# Patient Record
Sex: FEMALE | Race: BLACK OR AFRICAN AMERICAN | URBAN - METROPOLITAN AREA
[De-identification: names, ages, dates, MRNs, and addresses within clinical notes are randomized per-mention and may not be internally consistent; named-entity substitution may affect disease eponyms.]

---

## 2017-02-01 ENCOUNTER — OFFICE VISIT (OUTPATIENT)
Dept: FAMILY MEDICINE CLINIC | Age: 62
End: 2017-02-01

## 2017-02-01 VITALS
RESPIRATION RATE: 16 BRPM | SYSTOLIC BLOOD PRESSURE: 125 MMHG | WEIGHT: 119 LBS | HEIGHT: 65 IN | HEART RATE: 94 BPM | OXYGEN SATURATION: 97 % | TEMPERATURE: 98.1 F | DIASTOLIC BLOOD PRESSURE: 92 MMHG | BODY MASS INDEX: 19.83 KG/M2

## 2017-02-01 DIAGNOSIS — R53.83 FATIGUE, UNSPECIFIED TYPE: ICD-10-CM

## 2017-02-01 DIAGNOSIS — Z76.89 ENCOUNTER TO ESTABLISH CARE: ICD-10-CM

## 2017-02-01 DIAGNOSIS — R14.2 BURPING: ICD-10-CM

## 2017-02-01 NOTE — PATIENT INSTRUCTIONS
DASH Diet: Care Instructions  Your Care Instructions  The DASH diet is an eating plan that can help lower your blood pressure. DASH stands for Dietary Approaches to Stop Hypertension. Hypertension is high blood pressure. The DASH diet focuses on eating foods that are high in calcium, potassium, and magnesium. These nutrients can lower blood pressure. The foods that are highest in these nutrients are fruits, vegetables, low-fat dairy products, nuts, seeds, and legumes. But taking calcium, potassium, and magnesium supplements instead of eating foods that are high in those nutrients does not have the same effect. The DASH diet also includes whole grains, fish, and poultry. The DASH diet is one of several lifestyle changes your doctor may recommend to lower your high blood pressure. Your doctor may also want you to decrease the amount of sodium in your diet. Lowering sodium while following the DASH diet can lower blood pressure even further than just the DASH diet alone. Follow-up care is a key part of your treatment and safety. Be sure to make and go to all appointments, and call your doctor if you are having problems. It's also a good idea to know your test results and keep a list of the medicines you take. How can you care for yourself at home? Following the DASH diet  · Eat 4 to 5 servings of fruit each day. A serving is 1 medium-sized piece of fruit, ½ cup chopped or canned fruit, 1/4 cup dried fruit, or 4 ounces (½ cup) of fruit juice. Choose fruit more often than fruit juice. · Eat 4 to 5 servings of vegetables each day. A serving is 1 cup of lettuce or raw leafy vegetables, ½ cup of chopped or cooked vegetables, or 4 ounces (½ cup) of vegetable juice. Choose vegetables more often than vegetable juice. · Get 2 to 3 servings of low-fat and fat-free dairy each day. A serving is 8 ounces of milk, 1 cup of yogurt, or 1 ½ ounces of cheese. · Eat 6 to 8 servings of grains each day.  A serving is 1 slice of bread, 1 ounce of dry cereal, or ½ cup of cooked rice, pasta, or cooked cereal. Try to choose whole-grain products as much as possible. · Limit lean meat, poultry, and fish to 2 servings each day. A serving is 3 ounces, about the size of a deck of cards. · Eat 4 to 5 servings of nuts, seeds, and legumes (cooked dried beans, lentils, and split peas) each week. A serving is 1/3 cup of nuts, 2 tablespoons of seeds, or ½ cup of cooked beans or peas. · Limit fats and oils to 2 to 3 servings each day. A serving is 1 teaspoon of vegetable oil or 2 tablespoons of salad dressing. · Limit sweets and added sugars to 5 servings or less a week. A serving is 1 tablespoon jelly or jam, ½ cup sorbet, or 1 cup of lemonade. · Eat less than 2,300 milligrams (mg) of sodium a day. If you limit your sodium to 1,500 mg a day, you can lower your blood pressure even more. Tips for success  · Start small. Do not try to make dramatic changes to your diet all at once. You might feel that you are missing out on your favorite foods and then be more likely to not follow the plan. Make small changes, and stick with them. Once those changes become habit, add a few more changes. · Try some of the following:  ¨ Make it a goal to eat a fruit or vegetable at every meal and at snacks. This will make it easy to get the recommended amount of fruits and vegetables each day. ¨ Try yogurt topped with fruit and nuts for a snack or healthy dessert. ¨ Add lettuce, tomato, cucumber, and onion to sandwiches. ¨ Combine a ready-made pizza crust with low-fat mozzarella cheese and lots of vegetable toppings. Try using tomatoes, squash, spinach, broccoli, carrots, cauliflower, and onions. ¨ Have a variety of cut-up vegetables with a low-fat dip as an appetizer instead of chips and dip. ¨ Sprinkle sunflower seeds or chopped almonds over salads. Or try adding chopped walnuts or almonds to cooked vegetables. ¨ Try some vegetarian meals using beans and peas. Add garbanzo or kidney beans to salads. Make burritos and tacos with mashed arteaga beans or black beans. Where can you learn more? Go to http://saw-corina.info/. Enter U513 in the search box to learn more about \"DASH Diet: Care Instructions. \"  Current as of: March 23, 2016  Content Version: 11.1  © 1601-1332 [employer]. Care instructions adapted under license by [employer] (which disclaims liability or warranty for this information). If you have questions about a medical condition or this instruction, always ask your healthcare professional. [place] [address] any warranty or liability for your use of this information. How to Read a Food Label to Limit Sodium: Care Instructions  Your Care Instructions  Sodium causes your body to hold on to extra water. This can raise your blood pressure and force your heart and kidneys to work harder. In very serious cases, this could cause you to be put in the hospital. It might even be life-threatening. By limiting sodium, you will feel better and lower your risk of serious problems. Processed foods, fast food, and restaurant foods are the major sources of dietary sodium. The most common name for sodium is salt. Try to limit how much sodium you eat to less than 2,300 milligrams (mg) a day. If you limit your sodium to 1,500 mg a day, you can lower your blood pressure even more. This limit counts all the salt that you eat in foods you cook or in packaged foods. Keep a list of everything you eat and drink. Follow-up care is a key part of your treatment and safety. Be sure to make and go to all appointments, and call your doctor if you are having problems. It's also a good idea to know your test results and keep a list of the medicines you take. How can you care for yourself at home?   Read ingredient lists on food labels  · Read the list of ingredients on food labels to help you find how much sodium is in a food. The label lists the ingredients in a food in descending order (from the most to the least). If salt or sodium is high on the list, there may be a lot of sodium in the food. · Know that sodium has different names. Sodium is also called monosodium glutamate (MSG, common in [place] food), sodium citrate, sodium alginate, sodium hydroxide, and sodium phosphate. Read Nutrition Facts labels  · On most foods, there is a Nutrition Facts label. This will tell you how much sodium is in one serving of food. Look at both the serving size and the sodium amount. The serving size is located at the top of the label, usually right under the \"Nutrition Facts\" title. The amount of sodium is given in the list under the title. It is given in milligrams (mg). ¨ Check the serving size carefully. A single serving is often very small, and you may eat more than one serving. If this is the case, you will eat more sodium than listed on the label. For example, if the serving size for a canned soup is 1 cup and the sodium amount is 470 mg, if you have 2 cups you will eat 940 mg of sodium. · The nutrition facts for fresh fruits and vegetables are not listed on the food. They may be listed somewhere in the store. These foods usually have no sodium or low sodium. · The Nutrition Facts label also gives you the Percent Daily Value for sodium. This is how much of the recommended amount of sodium a serving contains. The daily value for sodium is less than 2,300 mg. So if the Percent Daily Value says 50%, this means one serving is giving you half of this, or 1,150 mg. Buy low-sodium foods  · Look for foods that are made with less sodium. Watch for the following words on the label. ¨ \"Unsalted\" means there is no sodium added to the food. But there may be sodium already in the food naturally. ¨ \"Sodium-free\" means a serving has less than 5 mg of sodium. ¨ \"Very low sodium\" means a serving has 35 mg or less of sodium.   ¨ \"Low-sodium\" means a serving has 140 mg or less of sodium. · \"Reduced-sodium\" means that there is 25% less sodium than what the food normally has. This is still usually too much sodium. Try not to buy foods with this on the label. · Buy fresh vegetables, or frozen vegetables without added sauces. Buy low-sodium versions of canned vegetables, soups, and other canned goods. Where can you learn more? Go to http://saw-corina.info/. Enter 26 [id number] in the search box to learn more about \"How to Read a Food Label to Limit Sodium: Care Instructions. \"  Current as of: July 26, 2016  Content Version: 11.1  © 0917-6634 [employer]. Care instructions adapted under license by [employer] (which disclaims liability or warranty for this information). If you have questions about a medical condition or this instruction, always ask your healthcare professional. [place] [address] any warranty or liability for your use of this information. Low Sodium Diet (2,000 Milligram): Care Instructions  Your Care Instructions  Too much sodium causes your body to hold on to extra water. This can raise your blood pressure and force your heart and kidneys to work harder. In very serious cases, this could cause you to be put in the hospital. It might even be life-threatening. By limiting sodium, you will feel better and lower your risk of serious problems. The most common source of sodium is salt. People get most of the salt in their diet from canned, prepared, and packaged foods. Fast food and restaurant meals also are very high in sodium. Your doctor will probably limit your sodium to less than 2,000 milligrams (mg) a day. This limit counts all the sodium in prepared and packaged foods and any salt you add to your food. Follow-up care is a key part of your treatment and safety. Be sure to make and go to all appointments, and call your doctor if you are having problems.  It's also a good idea to know your test results and keep a list of the medicines you take. How can you care for yourself at home? Read food labels  · Read labels on cans and food packages. The labels tell you how much sodium is in each serving. Make sure that you look at the serving size. If you eat more than the serving size, you have eaten more sodium. · Food labels also tell you the Percent Daily Value for sodium. Choose products with low Percent Daily Values for sodium. · Be aware that sodium can come in forms other than salt, including monosodium glutamate (MSG), sodium citrate, and sodium bicarbonate (baking soda). MSG is often added to Asian food. When you eat out, you can sometimes ask for food without MSG or added salt. Buy low-sodium foods  · Buy foods that are labeled \"unsalted\" (no salt added), \"sodium-free\" (less than 5 mg of sodium per serving), or \"low-sodium\" (less than 140 mg of sodium per serving). Foods labeled \"reduced-sodium\" and \"light sodium\" may still have too much sodium. Be sure to read the label to see how much sodium you are getting. · Buy fresh vegetables, or frozen vegetables without added sauces. Buy low-sodium versions of canned vegetables, soups, and other canned goods. Prepare low-sodium meals  · Cut back on the amount of salt you use in cooking. This will help you adjust to the taste. Do not add salt after cooking. One teaspoon of salt has about 2,300 mg of sodium. · Take the salt shaker off the table. · Flavor your food with garlic, lemon juice, onion, vinegar, herbs, and spices. Do not use soy sauce, lite soy sauce, steak sauce, onion salt, garlic salt, celery salt, mustard, or ketchup on your food. · Use low-sodium salad dressings, sauces, and ketchup. Or make your own salad dressings and sauces without adding salt. · Use less salt (or none) when recipes call for it. You can often use half the salt a recipe calls for without losing flavor.  Other foods such as rice, pasta, and grains do not need added salt. · Rinse canned vegetables, and cook them in fresh water. This removes some--but not all--of the salt. · Avoid water that is naturally high in sodium or that has been treated with water softeners, which add sodium. Call your local water company to find out the sodium content of your water supply. If you buy bottled water, read the label and choose a sodium-free brand. Avoid high-sodium foods  · Avoid eating:  ¨ Smoked, cured, salted, and canned meat, fish, and poultry. ¨ Ham, pantoja, hot dogs, and luncheon meats. ¨ Regular, hard, and processed cheese and regular peanut butter. ¨ Crackers with salted tops, and other salted snack foods such as pretzels, chips, and salted popcorn. ¨ Frozen prepared meals, unless labeled low-sodium. ¨ Canned and dried soups, broths, and bouillon, unless labeled sodium-free or low-sodium. ¨ Canned vegetables, unless labeled sodium-free or low-sodium. ¨ [place] [employer] fries, pizza, tacos, and other fast foods. ¨ Pickles, olives, ketchup, and other condiments, especially soy sauce, unless labeled sodium-free or low-sodium. Where can you learn more? Go to http://saw-corina.info/. Enter H588 in the search box to learn more about \"Low Sodium Diet (2,000 Milligram): Care Instructions. \"  Current as of: July 26, 2016  Content Version: 11.1  © 5869-0123 [employer]. Care instructions adapted under license by [employer] (which disclaims liability or warranty for this information). If you have questions about a medical condition or this instruction, always ask your healthcare professional. [place] [address] any warranty or liability for your use of this information.

## 2017-02-01 NOTE — PROGRESS NOTES
Chief Complaint   Patient presents with    New Patient     [facility]     HPI:    This is a 56 y/o female  patient who presents to \[facility]\"". Patient denies SOB, CP, dizziness, headache. Denies taking any medication. Recently relocated to [address] [facility] from \[facility]\"" 2 years ago. Have not had any PCP. Confirm history of bilateral cataract. Patient.scheduled to have cataract  surgery in her eyes and need pre op clearance scheduled for Monday. Right eye surgery scheduled for 2/7/17 and left eye 2/22/17. Diastolic BP slightly elevated today-125/92  She denies history of HTN but complain of frequent burping with mild chest discomfort. ROS: Pt denies: Wt loss, Fever/Chills, HA, Visual changes, SOB, BASS, Abd pain, N/V/D/C, Blood in stool or urine, Edema. Pertinent positive as above in HPI. All others were negative        History reviewed. No pertinent past medical history. History reviewed. No pertinent past surgical history. Family History   Problem Relation Age of Onset    Diabetes Mother     Cancer Father      unknown       Social History     Social History    Marital status: SINGLE     Spouse name: N/A    Number of children: N/A    Years of education: N/A     Occupational History    Not on file.      Social History Main Topics    Smoking status: Never Smoker    Smokeless tobacco: Never Used    Alcohol use No    Drug use: No    Sexual activity: Yes     Other Topics Concern    Not on file     Social History Narrative    No narrative on file       No Known Allergies      Physical Exam:    Vital Signs:     Visit Vitals    BP (!) 125/92    Pulse 94    Temp 98.1 °F (36.7 °C) (Oral)    Resp 16    Ht 5' 5\" (1.651 m)    Wt 119 lb (54 kg)    SpO2 97%    BMI 19.8 kg/m2         General: a, a & o x 3, afebrile,  interacting appropriately, in no acute distress  HEENT: head normocephalic and atraumatic, conjuctiva clear  Skin: warm and dry, no rashes , no bruises, no skin lesions  Neck: supple, symmetrical, no palpable mass, no thyromegaly, no carotid bruits, no JVD  Respiratory: lung sounds clear bilaterally, good respiratory effort, no wheezes or crackles  Cardiovascular: normal S1S2, regular rate and rhythm, no murmurs  Abdomen: non-distended, normal bowel sounds x 4 quadrants, soft, non-tender to palpation,no hepatosplenomegaly  Musculoskeletal: normal ROM on all joints, no swelling or deformity  Psychiatric: a, a & o x 3, appropriate mood and affect, no thought disorder    Assessment/Plan:    ICD-10-CM ICD-9-CM    1. Elevated BP I10 401.9 A   2. Burping R14.2 787.3 XR CHEST PA LAT   3. Encounter to establish care Z76.89 V65.8    4. Fatigue, unspecified type R53.83 780.79 CBC WITH AUTOMATED DIFF      TSH 3RD GENERATION      LIPID PANEL      VITAMIN D, 25 HYDROXY      METABOLIC PANEL, COMPREHENSIVE      T4, FREE      HEMOGLOBIN A1C WITH EAG           ICD-10-CM ICD-9-CM    1. Elevated BP I10 401.9  AMB POC EKG ROUTINE W/ 12 LEADS, INTER & REP- EKG show non specific ST depression. No Ischemia or T waves inversion. No prior EKG available for comparison. Low salt    Plan to repeat BP at her next visit. 2. Burping R14.2 787.3 XR CHEST PA LAT    Patient advised to avoid carbonated drinks and other gas forming food. 3. Encounter to establish care Z76.89 V65.8    4. Fatigue, unspecified type R53.83 780.79 CBC WITH AUTOMATED DIFF      TSH 3RD GENERATION      LIPID PANEL      VITAMIN D, 25 HYDROXY      METABOLIC PANEL, COMPREHENSIVE      T4, FREE      HEMOGLOBIN A1C WITH EAG         Additional Notes: Discussed today's diagnosis, treatment plans. Discussed medication indications and side effects. After Visit Summary: Provided and discussed printed patient instructions. Answered questions in relation to today's diagnosis.   Follow-up Disposition: Follow up Monday for Pre op exam and lab review        JULIO Dykes  [address] [facility],[address]            Orders Placed This Encounter  XR CHEST PA LAT    CBC WITH AUTOMATED DIFF    TSH 3RD GENERATION    LIPID PANEL    VITAMIN D, 25 HYDROXY    METABOLIC PANEL, COMPREHENSIVE    T4, FREE    HEMOGLOBIN A1C WITH EAG    AMB POC EKG ROUTINE W/ 12 LEADS, INTER & REP

## 2017-02-01 NOTE — MR AVS SNAPSHOT
Visit Information Date & Time Provider Department Dept. Phone Encounter #  
 2/1/2017  1:15 PM Enrique Ruiz, [address] [id number] Follow-up Instructions Return in about 5 days (around 2/6/2017) for follow up pre ope and lab review. Upcoming Health Maintenance Date Due DTaP/Tdap/Td series (1 - Tdap) 1/19/1974 PAP AKA CERVICAL CYTOLOGY 1/19/1974 BREAST CANCER SCRN MAMMOGRAM 1/19/2003 FOBT Q 1 YEAR AGE 50-75 1/19/2003 ZOSTER VACCINE AGE 60> 1/19/2013 INFLUENZA AGE 9 TO ADULT 8/1/2016 Allergies as of 2/1/2017  Review Complete On: 2/1/2017 By: Parul Cruz LPN No Known Allergies Current Immunizations  Reviewed on 2/1/2017 No immunizations on file. Reviewed by Parul Cruz LPN on 0/5/0467 at  4:34 PM  
You Were Diagnosed With   
  
 Codes Comments Elevated BP    -  Primary ICD-10-CM: I10 
ICD-9-CM: 401.9 Burping     ICD-10-CM: R14.2 ICD-9-CM: 787.3 Encounter to establish care     ICD-10-CM: Z76.89 
ICD-9-CM: V65.8 Fatigue, unspecified type     ICD-10-CM: R53.83 ICD-9-CM: 780.79 Vitals BP Pulse Temp Resp Height(growth percentile) Weight(growth percentile) (!) 125/92 94 98.1 °F (36.7 °C) (Oral) 16 5' 5\" (1.651 m) 119 lb (54 kg) SpO2 BMI OB Status Smoking Status 97% 19.8 kg/m2 Postmenopausal Never Smoker Vitals History BMI and BSA Data Body Mass Index Body Surface Area  
 19.8 kg/m 2 1.57 m 2 Your Updated Medication List  
  
Notice  As of 2/1/2017  2:27 PM  
 You have not been prescribed any medications. We Performed the Following AMB POC EKG ROUTINE W/ 12 LEADS, INTER & REP [22644 CPT(R)] Follow-up Instructions Return in about 5 days (around 2/6/2017) for follow up pre ope and lab review. To-Do List   
 02/01/2017 Imaging:  XR CHEST PA LAT   
  
 02/02/2017   Lab:  CBC WITH AUTOMATED DIFF   
  
 02/02/2017 Lab:  HEMOGLOBIN A1C WITH EAG   
  
 02/02/2017 Lab:  LIPID PANEL   
  
 02/02/2017 Lab:  METABOLIC PANEL, COMPREHENSIVE   
  
 02/02/2017 Lab:  T4, FREE   
  
 02/02/2017 Lab:  TSH 3RD GENERATION   
  
 02/02/2017 Lab:  VITAMIN D, 25 HYDROXY Patient Instructions DASH Diet: Care Instructions Your Care Instructions The DASH diet is an eating plan that can help lower your blood pressure. DASH stands for Dietary Approaches to Stop Hypertension. Hypertension is high blood pressure. The DASH diet focuses on eating foods that are high in calcium, potassium, and magnesium. These nutrients can lower blood pressure. The foods that are highest in these nutrients are fruits, vegetables, low-fat dairy products, nuts, seeds, and legumes. But taking calcium, potassium, and magnesium supplements instead of eating foods that are high in those nutrients does not have the same effect. The DASH diet also includes whole grains, fish, and poultry. The DASH diet is one of several lifestyle changes your doctor may recommend to lower your high blood pressure. Your doctor may also want you to decrease the amount of sodium in your diet. Lowering sodium while following the DASH diet can lower blood pressure even further than just the DASH diet alone. Follow-up care is a key part of your treatment and safety. Be sure to make and go to all appointments, and call your doctor if you are having problems. It's also a good idea to know your test results and keep a list of the medicines you take. How can you care for yourself at home? Following the DASH diet · Eat 4 to 5 servings of fruit each day. A serving is 1 medium-sized piece of fruit, ½ cup chopped or canned fruit, 1/4 cup dried fruit, or 4 ounces (½ cup) of fruit juice. Choose fruit more often than fruit juice. · Eat 4 to 5 servings of vegetables each day.  A serving is 1 cup of lettuce or raw leafy vegetables, ½ cup of chopped or cooked vegetables, or 4 ounces (½ cup) of vegetable juice. Choose vegetables more often than vegetable juice. · Get 2 to 3 servings of low-fat and fat-free dairy each day. A serving is 8 ounces of milk, 1 cup of yogurt, or 1 ½ ounces of cheese. · Eat 6 to 8 servings of grains each day. A serving is 1 slice of bread, 1 ounce of dry cereal, or ½ cup of cooked rice, pasta, or cooked cereal. Try to choose whole-grain products as much as possible. · Limit lean meat, poultry, and fish to 2 servings each day. A serving is 3 ounces, about the size of a deck of cards. · Eat 4 to 5 servings of nuts, seeds, and legumes (cooked dried beans, lentils, and split peas) each week. A serving is 1/3 cup of nuts, 2 tablespoons of seeds, or ½ cup of cooked beans or peas. · Limit fats and oils to 2 to 3 servings each day. A serving is 1 teaspoon of vegetable oil or 2 tablespoons of salad dressing. · Limit sweets and added sugars to 5 servings or less a week. A serving is 1 tablespoon jelly or jam, ½ cup sorbet, or 1 cup of lemonade. · Eat less than 2,300 milligrams (mg) of sodium a day. If you limit your sodium to 1,500 mg a day, you can lower your blood pressure even more. Tips for success · Start small. Do not try to make dramatic changes to your diet all at once. You might feel that you are missing out on your favorite foods and then be more likely to not follow the plan. Make small changes, and stick with them. Once those changes become habit, add a few more changes. · Try some of the following: ¨ Make it a goal to eat a fruit or vegetable at every meal and at snacks. This will make it easy to get the recommended amount of fruits and vegetables each day. ¨ Try yogurt topped with fruit and nuts for a snack or healthy dessert. ¨ Add lettuce, tomato, cucumber, and onion to sandwiches.  
¨ Combine a ready-made pizza crust with low-fat mozzarella cheese and lots of vegetable toppings. Try using tomatoes, squash, spinach, broccoli, carrots, cauliflower, and onions. ¨ Have a variety of cut-up vegetables with a low-fat dip as an appetizer instead of chips and dip. ¨ Sprinkle sunflower seeds or chopped almonds over salads. Or try adding chopped walnuts or almonds to cooked vegetables. ¨ Try some vegetarian meals using beans and peas. Add garbanzo or kidney beans to salads. Make burritos and tacos with mashed arteaga beans or black beans. Where can you learn more? Go to http://sawNeuralacorina.info/. Enter T071 in the search box to learn more about \"DASH Diet: Care Instructions. \" Current as of: March 23, 2016 Content Version: 11.1 © 2483-8155 [employer] Care instructions adapted under license by [employer] (which disclaims liability or warranty for this information). If you have questions about a medical condition or this instruction, always ask your healthcare professional. [place] [address] any warranty or liability for your use of this information. How to Read a Food Label to Limit Sodium: Care Instructions Your Care Instructions Sodium causes your body to hold on to extra water. This can raise your blood pressure and force your heart and kidneys to work harder. In very serious cases, this could cause you to be put in the hospital. It might even be life-threatening. By limiting sodium, you will feel better and lower your risk of serious problems. Processed foods, fast food, and restaurant foods are the major sources of dietary sodium. The most common name for sodium is salt. Try to limit how much sodium you eat to less than 2,300 milligrams (mg) a day. If you limit your sodium to 1,500 mg a day, you can lower your blood pressure even more. This limit counts all the salt that you eat in foods you cook or in packaged foods. Keep a list of everything you eat and drink. Follow-up care is a key part of your treatment and safety. Be sure to make and go to all appointments, and call your doctor if you are having problems. It's also a good idea to know your test results and keep a list of the medicines you take. How can you care for yourself at home? Read ingredient lists on food labels · Read the list of ingredients on food labels to help you find how much sodium is in a food. The label lists the ingredients in a food in descending order (from the most to the least). If salt or sodium is high on the list, there may be a lot of sodium in the food. · Know that sodium has different names. Sodium is also called monosodium glutamate (MSG, common in [facility] food), sodium citrate, sodium alginate, sodium hydroxide, and sodium phosphate. Read Nutrition Facts labels · On most foods, there is a Nutrition Facts label. This will tell you how much sodium is in one serving of food. Look at both the serving size and the sodium amount. The serving size is located at the top of the label, usually right under the \"Nutrition Facts\" title. The amount of sodium is given in the list under the title. It is given in milligrams (mg). ¨ Check the serving size carefully. A single serving is often very small, and you may eat more than one serving. If this is the case, you will eat more sodium than listed on the label. For example, if the serving size for a canned soup is 1 cup and the sodium amount is 470 mg, if you have 2 cups you will eat 940 mg of sodium. · The nutrition facts for fresh fruits and vegetables are not listed on the food. They may be listed somewhere in the store. These foods usually have no sodium or low sodium. · The Nutrition Facts label also gives you the Percent Daily Value for sodium. This is how much of the recommended amount of sodium a serving contains. The daily value for sodium is less than 2,300 mg.  So if the Percent Daily Value says 50%, this means one serving is giving you half of this, or 1,150 mg. Buy low-sodium foods · Look for foods that are made with less sodium. Watch for the following words on the label. ¨ \"Unsalted\" means there is no sodium added to the food. But there may be sodium already in the food naturally. ¨ \"Sodium-free\" means a serving has less than 5 mg of sodium. ¨ \"Very low sodium\" means a serving has 35 mg or less of sodium. ¨ \"Low-sodium\" means a serving has 140 mg or less of sodium. · \"Reduced-sodium\" means that there is 25% less sodium than what the food normally has. This is still usually too much sodium. Try not to buy foods with this on the label. · Buy fresh vegetables, or frozen vegetables without added sauces. Buy low-sodium versions of canned vegetables, soups, and other canned goods. Where can you learn more? Go to http://saw-corina.info/. Enter 26 981006 in the search box to learn more about \"How to Read a Food Label to Limit Sodium: Care Instructions. \" Current as of: July 26, 2016 Content Version: 11.1 © 7410-3477 [employer]. Care instructions adapted under license by [employer] (which disclaims liability or warranty for this information). If you have questions about a medical condition or this instruction, always ask your healthcare professional. [place] [address] any warranty or liability for your use of this information. Low Sodium Diet (2,000 Milligram): Care Instructions Your Care Instructions Too much sodium causes your body to hold on to extra water. This can raise your blood pressure and force your heart and kidneys to work harder. In very serious cases, this could cause you to be put in the hospital. It might even be life-threatening. By limiting sodium, you will feel better and lower your risk of serious problems. The most common source of sodium is salt.  People get most of the salt in their diet from canned, prepared, and packaged foods. Fast food and restaurant meals also are very high in sodium. Your doctor will probably limit your sodium to less than 2,000 milligrams (mg) a day. This limit counts all the sodium in prepared and packaged foods and any salt you add to your food. Follow-up care is a key part of your treatment and safety. Be sure to make and go to all appointments, and call your doctor if you are having problems. It's also a good idea to know your test results and keep a list of the medicines you take. How can you care for yourself at home? Read food labels · Read labels on cans and food packages. The labels tell you how much sodium is in each serving. Make sure that you look at the serving size. If you eat more than the serving size, you have eaten more sodium. · Food labels also tell you the Percent Daily Value for sodium. Choose products with low Percent Daily Values for sodium. · Be aware that sodium can come in forms other than salt, including monosodium glutamate (MSG), sodium citrate, and sodium bicarbonate (baking soda). MSG is often added to Asian food. When you eat out, you can sometimes ask for food without MSG or added salt. Buy low-sodium foods · Buy foods that are labeled \"unsalted\" (no salt added), \"sodium-free\" (less than 5 mg of sodium per serving), or \"low-sodium\" (less than 140 mg of sodium per serving). Foods labeled \"reduced-sodium\" and \"light sodium\" may still have too much sodium. Be sure to read the label to see how much sodium you are getting. · Buy fresh vegetables, or frozen vegetables without added sauces. Buy low-sodium versions of canned vegetables, soups, and other canned goods. Prepare low-sodium meals · Cut back on the amount of salt you use in cooking. This will help you adjust to the taste. Do not add salt after cooking. One teaspoon of salt has about 2,300 mg of sodium. · Take the salt shaker off the table. · Flavor your food with garlic, lemon juice, onion, vinegar, herbs, and spices. Do not use soy sauce, lite soy sauce, steak sauce, onion salt, garlic salt, celery salt, mustard, or ketchup on your food. · Use low-sodium salad dressings, sauces, and ketchup. Or make your own salad dressings and sauces without adding salt. · Use less salt (or none) when recipes call for it. You can often use half the salt a recipe calls for without losing flavor. Other foods such as rice, pasta, and grains do not need added salt. · Rinse canned vegetables, and cook them in fresh water. This removes somebut not allof the salt. · Avoid water that is naturally high in sodium or that has been treated with water softeners, which add sodium. Call your local water company to find out the sodium content of your water supply. If you buy bottled water, read the label and choose a sodium-free brand. Avoid high-sodium foods · Avoid eating: ¨ Smoked, cured, salted, and canned meat, fish, and poultry. ¨ Ham, pantoja, hot dogs, and luncheon meats. ¨ Regular, hard, and processed cheese and regular peanut butter. ¨ Crackers with salted tops, and other salted snack foods such as pretzels, chips, and salted popcorn. ¨ Frozen prepared meals, unless labeled low-sodium. ¨ Canned and dried soups, broths, and bouillon, unless labeled sodium-free or low-sodium. ¨ Canned vegetables, unless labeled sodium-free or low-sodium. ¨ [employer]o fries, pizza, tacos, and other fast foods. ¨ Pickles, olives, ketchup, and other condiments, especially soy sauce, unless labeled sodium-free or low-sodium. Where can you learn more? Go to http://saw-corina.info/. Enter Z775 in the search box to learn more about \"Low Sodium Diet (2,000 Milligram): Care Instructions. \" Current as of: July 26, 2016 Content Version: 11.1 © 5960-1623 [employer].  Care instructions adapted under license by [address] (which disclaims liability or warranty for this information). If you have questions about a medical condition or this instruction, always ask your healthcare professional. [address] any warranty or liability for your use of this information. Introducing [address]! Neyda Saenz introduces [employer] patient portal. Now you can access parts of your medical record, email your doctor's office, and request medication refills online. 1. In your internet browser, go to [email or web address] 2. Click on the First Time User? Click Here link in the Sign In box. You will see the New Member Sign Up page. 3. Enter your [employer] Access Code exactly as it appears below. You will not need to use this code after youve completed the sign-up process. If you do not sign up before the expiration date, you must request a new code. · [employer] Access Code: [email or web address] Expires: 5/2/2017  2:26 PM 
 
4. Enter the last four digits of your Social Security Number (xxxx) and Date of Birth (mm/dd/yyyy) as indicated and click Submit. You will be taken to the next sign-up page. 5. Create a [employer] ID. This will be your [employer] login ID and cannot be changed, so think of one that is secure and easy to remember. 6. Create a [employer] password. You can change your password at any time. 7. Enter your Password Reset Question and Answer. This can be used at a later time if you forget your password. 8. Enter your e-mail address. You will receive e-mail notification when new information is available in [address] 9. Click Sign Up. You can now view and download portions of your medical record. 10. Click the Download Summary menu link to download a portable copy of your medical information. If you have questions, please visit the Frequently Asked Questions section of the [employer] website.  Remember, [employer] is NOT to be used for urgent needs. For medical emergencies, dial 911. Now available from your iPhone and Android! Please provide this summary of care documentation to your next provider. Your primary care clinician is listed as Jonda Meckel. If you have any questions after today's visit, please call [phone].

## 2017-02-02 DIAGNOSIS — R53.83 FATIGUE, UNSPECIFIED TYPE: ICD-10-CM

## 2017-02-02 NOTE — LETTER
2/6/2017 2:15 PM 
 
Ms. Landon Antonio [address] [facility] [address] Dear Landon Antonio: Please find your most recent results below. Resulted Orders CBC WITH AUTOMATED DIFF Result Value Ref Range WBC 6.2 3.4 - 10.8 x10E3/uL  
 RBC 4.38 3.77 - 5.28 x10E6/uL HGB 12.9 11.1 - 15.9 g/dL HCT 40.6 34.0 - 46.6 % MCV 93 79 - 97 fL  
 MCH 29.5 26.6 - 33.0 pg  
 MCHC 31.8 31.5 - 35.7 g/dL  
 RDW 12.3 12.3 - 15.4 % PLATELET 775 016 - 024 x10E3/uL NEUTROPHILS 43 % Lymphocytes 45 % MONOCYTES 9 % EOSINOPHILS 2 % BASOPHILS 1 %  
 ABS. NEUTROPHILS 2.7 1.4 - 7.0 x10E3/uL Abs Lymphocytes 2.8 0.7 - 3.1 x10E3/uL  
 ABS. MONOCYTES 0.6 0.1 - 0.9 x10E3/uL  
 ABS. EOSINOPHILS 0.1 0.0 - 0.4 x10E3/uL  
 ABS. BASOPHILS 0.0 0.0 - 0.2 x10E3/uL IMMATURE GRANULOCYTES 0 %  
 ABS. IMM. GRANS. 0.0 0.0 - 0.1 x10E3/uL Narrative Performed at:  [address]  [id number] : Nino Villanueva MD, Phone:  [phone] TSH 3RD GENERATION Result Value Ref Range TSH 1.700 0.450 - 4.500 uIU/mL Narrative Performed at:  [address]  [id number] : Nino Villanueva MD, Phone:  [phone] LIPID PANEL Result Value Ref Range Cholesterol, total 199 100 - 199 mg/dL Triglyceride 70 0 - 149 mg/dL HDL Cholesterol 67 >39 mg/dL VLDL, calculated 14 5 - 40 mg/dL LDL, calculated 118 (H) 0 - 99 mg/dL Narrative Performed at:  [address]  [id number] : Nino Villanueva MD, Phone:  [phone] VITAMIN D, 25 HYDROXY Result Value Ref Range VITAMIN D, 25-HYDROXY 26.7 (L) 30.0 - 100.0 ng/mL Comment:  
   Vitamin D deficiency has been defined by the [facility] practice guideline as a 
level of serum 25-OH vitamin D less than 20 ng/mL (1,2). The Endocrine Society went on to further define vitamin D 
insufficiency as a level between 21 and 29 ng/mL (2). 1. IOM ([place] of [employer]). 2010. Dietary reference 
   intakes for calcium and D. [facility] 
   Connected. 2. Lis MF, Fei NC, Colleen DIMAS, et al. 
   Evaluation, treatment, and prevention of vitamin D 
   deficiency: an Endocrine Society clinical practice 
   guideline. JCEM. 2011 Jul; 96(7):1911-30. Narrative Performed at:  [address]  [id number] : Magen Ordonez MD, Phone:  [phone] METABOLIC PANEL, COMPREHENSIVE Result Value Ref Range Glucose 93 65 - 99 mg/dL BUN 11 8 - 27 mg/dL Creatinine 0.81 0.57 - 1.00 mg/dL GFR est non-AA 77 >59 mL/min/1.73 GFR est AA 89 >59 mL/min/1.73  
 BUN/Creatinine ratio 14 11 - 26 Sodium 146 (H) 134 - 144 mmol/L Potassium 4.1 3.5 - 5.2 mmol/L Chloride 107 (H) 96 - 106 mmol/L  
 CO2 25 18 - 29 mmol/L Calcium 10.8 (H) 8.7 - 10.3 mg/dL Protein, total 7.1 6.0 - 8.5 g/dL Albumin 4.1 3.6 - 4.8 g/dL GLOBULIN, TOTAL 3.0 1.5 - 4.5 g/dL A-G Ratio 1.4 1.1 - 2.5 Bilirubin, total 0.8 0.0 - 1.2 mg/dL Alk. phosphatase 68 39 - 117 IU/L  
 AST (SGOT) 19 0 - 40 IU/L  
 ALT (SGPT) 21 0 - 32 IU/L Narrative Performed at:  [address]  [id number] : Magen Ordonez MD, Phone:  [phone] T4, FREE Result Value Ref Range T4, Free 1.23 0.82 - 1.77 ng/dL Narrative Performed at:  [address]  [id number] : Magen Ordonez MD, Phone:  [phone] HEMOGLOBIN A1C WITH EAG Result Value Ref Range Hemoglobin A1c 5.2 4.8 - 5.6 % Comment:  
            Pre-diabetes: 5.7 - 6.4 Diabetes: >6.4 Glycemic control for adults with diabetes: <7.0 Estimated average glucose 103 mg/dL Narrative Performed at:  [address]  [id number] : Margarette Vallecillo MD, Phone:  [phone] CVD REPORT Result Value Ref Range INTERPRETATION Note Comment:  
   Supplement report is available. Narrative Performed at:  [address] [place] [address]  [id number] : Susan Rouse PhD, Phone:  [phone] RECOMMENDATIONS: 
  Labs reviewed essentially normal except for;  
Elevated LDL - 118. Manage with diet and exercise. Vit d low- advise to take 1000 units daily of Vitamin D... Over the counter. 
    
   
 
 
 
Please call me if you have any questions: [phone] Sincerely, Lisa Cavazos NP

## 2017-02-03 LAB
25(OH)D3+25(OH)D2 SERPL-MCNC: 26.7 NG/ML (ref 30–100)
ALBUMIN SERPL-MCNC: 4.1 G/DL (ref 3.6–4.8)
ALBUMIN/GLOB SERPL: 1.4 {RATIO} (ref 1.1–2.5)
ALP SERPL-CCNC: 68 IU/L (ref 39–117)
ALT SERPL-CCNC: 21 IU/L (ref 0–32)
AST SERPL-CCNC: 19 IU/L (ref 0–40)
BASOPHILS # BLD AUTO: 0 X10E3/UL (ref 0–0.2)
BASOPHILS NFR BLD AUTO: 1 %
BILIRUB SERPL-MCNC: 0.8 MG/DL (ref 0–1.2)
BUN SERPL-MCNC: 11 MG/DL (ref 8–27)
BUN/CREAT SERPL: 14 (ref 11–26)
CALCIUM SERPL-MCNC: 10.8 MG/DL (ref 8.7–10.3)
CHLORIDE SERPL-SCNC: 107 MMOL/L (ref 96–106)
CHOLEST SERPL-MCNC: 199 MG/DL (ref 100–199)
CO2 SERPL-SCNC: 25 MMOL/L (ref 18–29)
CREAT SERPL-MCNC: 0.81 MG/DL (ref 0.57–1)
EOSINOPHIL # BLD AUTO: 0.1 X10E3/UL (ref 0–0.4)
EOSINOPHIL NFR BLD AUTO: 2 %
ERYTHROCYTE [DISTWIDTH] IN BLOOD BY AUTOMATED COUNT: 12.3 % (ref 12.3–15.4)
EST. AVERAGE GLUCOSE BLD GHB EST-MCNC: 103 MG/DL
GLOBULIN SER CALC-MCNC: 3 G/DL (ref 1.5–4.5)
GLUCOSE SERPL-MCNC: 93 MG/DL (ref 65–99)
HBA1C MFR BLD: 5.2 % (ref 4.8–5.6)
HCT VFR BLD AUTO: 40.6 % (ref 34–46.6)
HDLC SERPL-MCNC: 67 MG/DL
HGB BLD-MCNC: 12.9 G/DL (ref 11.1–15.9)
IMM GRANULOCYTES # BLD: 0 X10E3/UL (ref 0–0.1)
IMM GRANULOCYTES NFR BLD: 0 %
INTERPRETATION, 910389: NORMAL
LDLC SERPL CALC-MCNC: 118 MG/DL (ref 0–99)
LYMPHOCYTES # BLD AUTO: 2.8 X10E3/UL (ref 0.7–3.1)
LYMPHOCYTES NFR BLD AUTO: 45 %
MCH RBC QN AUTO: 29.5 PG (ref 26.6–33)
MCHC RBC AUTO-ENTMCNC: 31.8 G/DL (ref 31.5–35.7)
MCV RBC AUTO: 93 FL (ref 79–97)
MONOCYTES # BLD AUTO: 0.6 X10E3/UL (ref 0.1–0.9)
MONOCYTES NFR BLD AUTO: 9 %
NEUTROPHILS # BLD AUTO: 2.7 X10E3/UL (ref 1.4–7)
NEUTROPHILS NFR BLD AUTO: 43 %
PLATELET # BLD AUTO: 252 X10E3/UL (ref 150–379)
POTASSIUM SERPL-SCNC: 4.1 MMOL/L (ref 3.5–5.2)
PROT SERPL-MCNC: 7.1 G/DL (ref 6–8.5)
RBC # BLD AUTO: 4.38 X10E6/UL (ref 3.77–5.28)
SODIUM SERPL-SCNC: 146 MMOL/L (ref 134–144)
T4 FREE SERPL-MCNC: 1.23 NG/DL (ref 0.82–1.77)
TRIGL SERPL-MCNC: 70 MG/DL (ref 0–149)
TSH SERPL DL<=0.005 MIU/L-ACNC: 1.7 UIU/ML (ref 0.45–4.5)
VLDLC SERPL CALC-MCNC: 14 MG/DL (ref 5–40)
WBC # BLD AUTO: 6.2 X10E3/UL (ref 3.4–10.8)

## 2017-02-06 ENCOUNTER — OFFICE VISIT (OUTPATIENT)
Dept: FAMILY MEDICINE CLINIC | Age: 62
End: 2017-02-06

## 2017-02-06 VITALS
RESPIRATION RATE: 16 BRPM | HEIGHT: 65 IN | SYSTOLIC BLOOD PRESSURE: 142 MMHG | TEMPERATURE: 97.6 F | WEIGHT: 119 LBS | HEART RATE: 109 BPM | BODY MASS INDEX: 19.83 KG/M2 | DIASTOLIC BLOOD PRESSURE: 87 MMHG

## 2017-02-06 DIAGNOSIS — Z01.818 PRE-OPERATIVE CLEARANCE: Primary | ICD-10-CM

## 2017-02-06 DIAGNOSIS — H26.9 CATARACT OF BOTH EYES: ICD-10-CM

## 2017-02-06 DIAGNOSIS — E55.9 VITAMIN D INSUFFICIENCY: ICD-10-CM

## 2017-02-06 DIAGNOSIS — R00.0 TACHYCARDIA: ICD-10-CM

## 2017-02-06 DIAGNOSIS — E78.00 ELEVATED LDL CHOLESTEROL LEVEL: ICD-10-CM

## 2017-02-06 NOTE — PATIENT INSTRUCTIONS
Cataract Surgery: Before Your Surgery  What is cataract surgery? Cataracts are cloudy areas in the lens of your eye. Your lens is behind the colored part of your eye (iris). Its job is to focus light onto the back of your eye. In some people, cataracts prevent light from reaching the back of the eye. This can cause vision problems. Cataract surgery helps you see better. It replaces your natural lens, which has become cloudy, with a clear artificial one. There are two types of cataract surgery. Phacoemulsification (say \"kcce-dd-co-beb-uak-wni-LORENA-shun\") is the most common type. The doctor makes a small cut in your eye. This cut is called an incision. The doctor uses a special ultrasound tool to break your cloudy lens apart. Sometimes a laser is used too. Then he or she removes the small pieces of the lens through the incision. In most cases, the doctor then inserts an artificial lens through the incision. Most people do not need stitches, because the incision is so small. If the doctor is not able to put in an artificial lens, you can wear a contact lens or thick glasses in place of your natural lens. Extracapsular extraction is a less common type of cataract surgery. The doctor makes a larger incision to remove the whole lens at once. After the doctor removes the lens, he or she stitches up the incision. Recovery from this type of surgery takes longer. Before either surgery, the doctor puts numbing drops in your eye. Some doctors use a shot instead. You may also get medicine to make you feel relaxed. You probably will not feel much pain. The surgery takes about 20 to 40 minutes. After surgery, you may have a bandage or shield on your eye. You will probably go home from surgery after 1 hour in the recovery room. Most people see better in 1 to 3 days. You may be able to go back to work or your normal routine in a few days.  It could take 3 to 10 weeks for your eye to completely heal. After your eye heals, you may still need to wear glasses, especially for reading. Follow-up care is a key part of your treatment and safety. Be sure to make and go to all appointments, and call your doctor if you are having problems. It's also a good idea to know your test results and keep a list of the medicines you take. What happens before surgery? Surgery can be stressful. This information will help you understand what you can expect. And it will help you safely prepare for surgery. Preparing for surgery  · Understand exactly what surgery is planned, along with the risks, benefits, and other options. · Tell your doctors ALL the medicines, vitamins, supplements, and herbal remedies you take. Some of these can increase the risk of bleeding or interact with anesthesia. · If you take blood thinners, such as warfarin (Coumadin), clopidogrel (Plavix), or aspirin, be sure to talk to your doctor. He or she will tell you if you should stop taking these medicines before your surgery. Make sure that you understand exactly what your doctor wants you to do. · Your doctor will tell you which medicines to take or stop before your surgery. You may need to stop taking certain medicines a week or more before surgery. So talk to your doctor as soon as you can. · If you have an advance directive, let your doctor know. It may include a living will and a durable power of  for health care. Bring a copy to the hospital. If you don't have one, you may want to prepare one. It lets your doctor and loved ones know your health care wishes. Doctors advise that everyone prepare these papers before any type of surgery or procedure. What happens on the day of surgery? · Follow the instructions exactly about when to stop eating and drinking. If you don't, your surgery may be canceled. If your doctor told you to take your medicines on the day of surgery, take them with only a sip of water. · Take a bath or shower before you come in for your surgery. Do not apply lotions, perfumes, deodorants, or nail polish. · Take off all jewelry and piercings. And take out contact lenses, if you wear them. At the hospital or surgery center  · Bring a picture ID. · The area for surgery is often marked to make sure there are no errors. · You will be kept comfortable and safe by your anesthesia provider. The anesthesia may make you sleep. Or it may just numb the area being worked on. · The surgery will take about 20 to 40 minutes. Going home  · Be sure you have someone to drive you home. Anesthesia and pain medicine make it unsafe for you to drive. · You will be given more specific instructions about recovering from your surgery. They will cover things like diet, wound care, follow-up care, driving, and getting back to your normal routine. · You may have a bandage or patch over your eye. You may also have a clear shield over your eye. This prevents you from rubbing it. When should you call your doctor? · You have questions or concerns. · You don't understand how to prepare for your surgery. · You become ill before the surgery (such as fever, flu, or a cold). · You need to reschedule or have changed your mind about having the surgery. Where can you learn more? Go to http://saw-corina.info/. Enter K474 in the search box to learn more about \"Cataract Surgery: Before Your Surgery. \"  Current as of: May 23, 2016  Content Version: 11.1  © 0951-4125 [employer]. Care instructions adapted under license by [employer] (which disclaims liability or warranty for this information). If you have questions about a medical condition or this instruction, always ask your healthcare professional. [place] [address] any warranty or liability for your use of this information. DASH Diet: Care Instructions  Your Care Instructions  The DASH diet is an eating plan that can help lower your blood pressure.  DASH stands for Dietary Approaches to Stop Hypertension. Hypertension is high blood pressure. The DASH diet focuses on eating foods that are high in calcium, potassium, and magnesium. These nutrients can lower blood pressure. The foods that are highest in these nutrients are fruits, vegetables, low-fat dairy products, nuts, seeds, and legumes. But taking calcium, potassium, and magnesium supplements instead of eating foods that are high in those nutrients does not have the same effect. The DASH diet also includes whole grains, fish, and poultry. The DASH diet is one of several lifestyle changes your doctor may recommend to lower your high blood pressure. Your doctor may also want you to decrease the amount of sodium in your diet. Lowering sodium while following the DASH diet can lower blood pressure even further than just the DASH diet alone. Follow-up care is a key part of your treatment and safety. Be sure to make and go to all appointments, and call your doctor if you are having problems. It's also a good idea to know your test results and keep a list of the medicines you take. How can you care for yourself at home? Following the DASH diet  · Eat 4 to 5 servings of fruit each day. A serving is 1 medium-sized piece of fruit, ½ cup chopped or canned fruit, 1/4 cup dried fruit, or 4 ounces (½ cup) of fruit juice. Choose fruit more often than fruit juice. · Eat 4 to 5 servings of vegetables each day. A serving is 1 cup of lettuce or raw leafy vegetables, ½ cup of chopped or cooked vegetables, or 4 ounces (½ cup) of vegetable juice. Choose vegetables more often than vegetable juice. · Get 2 to 3 servings of low-fat and fat-free dairy each day. A serving is 8 ounces of milk, 1 cup of yogurt, or 1 ½ ounces of cheese. · Eat 6 to 8 servings of grains each day. A serving is 1 slice of bread, 1 ounce of dry cereal, or ½ cup of cooked rice, pasta, or cooked cereal. Try to choose whole-grain products as much as possible.   · Limit lean meat, poultry, and fish to 2 servings each day. A serving is 3 ounces, about the size of a deck of cards. · Eat 4 to 5 servings of nuts, seeds, and legumes (cooked dried beans, lentils, and split peas) each week. A serving is 1/3 cup of nuts, 2 tablespoons of seeds, or ½ cup of cooked beans or peas. · Limit fats and oils to 2 to 3 servings each day. A serving is 1 teaspoon of vegetable oil or 2 tablespoons of salad dressing. · Limit sweets and added sugars to 5 servings or less a week. A serving is 1 tablespoon jelly or jam, ½ cup sorbet, or 1 cup of lemonade. · Eat less than 2,300 milligrams (mg) of sodium a day. If you limit your sodium to 1,500 mg a day, you can lower your blood pressure even more. Tips for success  · Start small. Do not try to make dramatic changes to your diet all at once. You might feel that you are missing out on your favorite foods and then be more likely to not follow the plan. Make small changes, and stick with them. Once those changes become habit, add a few more changes. · Try some of the following:  ¨ Make it a goal to eat a fruit or vegetable at every meal and at snacks. This will make it easy to get the recommended amount of fruits and vegetables each day. ¨ Try yogurt topped with fruit and nuts for a snack or healthy dessert. ¨ Add lettuce, tomato, cucumber, and onion to sandwiches. ¨ Combine a ready-made pizza crust with low-fat mozzarella cheese and lots of vegetable toppings. Try using tomatoes, squash, spinach, broccoli, carrots, cauliflower, and onions. ¨ Have a variety of cut-up vegetables with a low-fat dip as an appetizer instead of chips and dip. ¨ Sprinkle sunflower seeds or chopped almonds over salads. Or try adding chopped walnuts or almonds to cooked vegetables. ¨ Try some vegetarian meals using beans and peas. Add garbanzo or kidney beans to salads. Make burritos and tacos with mashed arteaga beans or black beans. Where can you learn more?   Go to http://saw-corina.info/. Enter L031 in the search box to learn more about \"DASH Diet: Care Instructions. \"  Current as of: March 23, 2016  Content Version: 11.1  © 9626-8265 [employer]. Care instructions adapted under license by [employer] (which disclaims liability or warranty for this information). If you have questions about a medical condition or this instruction, always ask your healthcare professional. [place] [address] any warranty or liability for your use of this information.

## 2017-02-06 NOTE — MR AVS SNAPSHOT
Visit Information Date & Time Provider Department Dept. Phone Encounter #  
 2/6/2017  9:00 AM Niko Stover [id number] Follow-up Instructions Return in about 1 month (around 3/6/2017) for follow up for elevated BP. Upcoming Health Maintenance Date Due Hepatitis C Screening 1955 DTaP/Tdap/Td series (1 - Tdap) 1/19/1974 PAP AKA CERVICAL CYTOLOGY 1/19/1974 BREAST CANCER SCRN MAMMOGRAM 1/19/2003 FOBT Q 1 YEAR AGE 50-75 1/19/2003 ZOSTER VACCINE AGE 60> 1/19/2013 INFLUENZA AGE 9 TO ADULT 8/1/2016 Allergies as of 2/6/2017  Review Complete On: 2/6/2017 By: Chau Keen LPN No Known Allergies Current Immunizations  Reviewed on 2/6/2017 No immunizations on file. Reviewed by Chau Keen LPN on 2/2/7619 at  5:91 AM  
Vitals BP Pulse Temp Resp Height(growth percentile) Weight(growth percentile) 142/87 (!) 109 97.6 °F (36.4 °C) (Oral) 16 5' 5\" (1.651 m) 119 lb (54 kg) BMI OB Status Smoking Status 19.8 kg/m2 Postmenopausal Never Smoker Vitals History BMI and BSA Data Body Mass Index Body Surface Area  
 19.8 kg/m 2 1.57 m 2 Your Updated Medication List  
  
Notice  As of 2/6/2017  9:29 AM  
 You have not been prescribed any medications. Follow-up Instructions Return in about 1 month (around 3/6/2017) for follow up for elevated BP. Patient Instructions Cataract Surgery: Before Your Surgery What is cataract surgery? Cataracts are cloudy areas in the lens of your eye. Your lens is behind the colored part of your eye (iris). Its job is to focus light onto the back of your eye. In some people, cataracts prevent light from reaching the back of the eye. This can cause vision problems. Cataract surgery helps you see better. It replaces your natural lens, which has become cloudy, with a clear artificial one. There are two types of cataract surgery. Phacoemulsification (say \"razk-ss-dx-ajd-ygi-kib-LORENA-shun\") is the most common type. The doctor makes a small cut in your eye. This cut is called an incision. The doctor uses a special ultrasound tool to break your cloudy lens apart. Sometimes a laser is used too. Then he or she removes the small pieces of the lens through the incision. In most cases, the doctor then inserts an artificial lens through the incision. Most people do not need stitches, because the incision is so small. If the doctor is not able to put in an artificial lens, you can wear a contact lens or thick glasses in place of your natural lens. Extracapsular extraction is a less common type of cataract surgery. The doctor makes a larger incision to remove the whole lens at once. After the doctor removes the lens, he or she stitches up the incision. Recovery from this type of surgery takes longer. Before either surgery, the doctor puts numbing drops in your eye. Some doctors use a shot instead. You may also get medicine to make you feel relaxed. You probably will not feel much pain. The surgery takes about 20 to 40 minutes. After surgery, you may have a bandage or shield on your eye. You will probably go home from surgery after 1 hour in the recovery room. Most people see better in 1 to 3 days. You may be able to go back to work or your normal routine in a few days. It could take 3 to 10 weeks for your eye to completely heal. After your eye heals, you may still need to wear glasses, especially for reading. Follow-up care is a key part of your treatment and safety. Be sure to make and go to all appointments, and call your doctor if you are having problems. It's also a good idea to know your test results and keep a list of the medicines you take. What happens before surgery? Surgery can be stressful.  This information will help you understand what you can expect. And it will help you safely prepare for surgery. Preparing for surgery · Understand exactly what surgery is planned, along with the risks, benefits, and other options. · Tell your doctors ALL the medicines, vitamins, supplements, and herbal remedies you take. Some of these can increase the risk of bleeding or interact with anesthesia. · If you take blood thinners, such as warfarin (Coumadin), clopidogrel (Plavix), or aspirin, be sure to talk to your doctor. He or she will tell you if you should stop taking these medicines before your surgery. Make sure that you understand exactly what your doctor wants you to do. · Your doctor will tell you which medicines to take or stop before your surgery. You may need to stop taking certain medicines a week or more before surgery. So talk to your doctor as soon as you can. · If you have an advance directive, let your doctor know. It may include a living will and a durable power of  for health care. Bring a copy to the hospital. If you don't have one, you may want to prepare one. It lets your doctor and loved ones know your health care wishes. Doctors advise that everyone prepare these papers before any type of surgery or procedure. What happens on the day of surgery? · Follow the instructions exactly about when to stop eating and drinking. If you don't, your surgery may be canceled. If your doctor told you to take your medicines on the day of surgery, take them with only a sip of water. · Take a bath or shower before you come in for your surgery. Do not apply lotions, perfumes, deodorants, or nail polish. · Take off all jewelry and piercings. And take out contact lenses, if you wear them. At the hospital or surgery center · Bring a picture ID. · The area for surgery is often marked to make sure there are no errors. · You will be kept comfortable and safe by your anesthesia provider.  The anesthesia may make you sleep. Or it may just numb the area being worked on. · The surgery will take about 20 to 40 minutes. Going home · Be sure you have someone to drive you home. Anesthesia and pain medicine make it unsafe for you to drive. · You will be given more specific instructions about recovering from your surgery. They will cover things like diet, wound care, follow-up care, driving, and getting back to your normal routine. · You may have a bandage or patch over your eye. You may also have a clear shield over your eye. This prevents you from rubbing it. When should you call your doctor? · You have questions or concerns. · You don't understand how to prepare for your surgery. · You become ill before the surgery (such as fever, flu, or a cold). · You need to reschedule or have changed your mind about having the surgery. Where can you learn more? Go to http://saw-corina.info/. Enter K474 in the search box to learn more about \"Cataract Surgery: Before Your Surgery. \" Current as of: May 23, 2016 Content Version: 11.1 © 9824-5068 [employer]. Care instructions adapted under license by [employer] (which disclaims liability or warranty for this information). If you have questions about a medical condition or this instruction, always ask your healthcare professional. [address] any warranty or liability for your use of this information. DASH Diet: Care Instructions Your Care Instructions The DASH diet is an eating plan that can help lower your blood pressure. DASH stands for Dietary Approaches to Stop Hypertension. Hypertension is high blood pressure. The DASH diet focuses on eating foods that are high in calcium, potassium, and magnesium. These nutrients can lower blood pressure.  The foods that are highest in these nutrients are fruits, vegetables, low-fat dairy products, nuts, seeds, and legumes. But taking calcium, potassium, and magnesium supplements instead of eating foods that are high in those nutrients does not have the same effect. The DASH diet also includes whole grains, fish, and poultry. The DASH diet is one of several lifestyle changes your doctor may recommend to lower your high blood pressure. Your doctor may also want you to decrease the amount of sodium in your diet. Lowering sodium while following the DASH diet can lower blood pressure even further than just the DASH diet alone. Follow-up care is a key part of your treatment and safety. Be sure to make and go to all appointments, and call your doctor if you are having problems. It's also a good idea to know your test results and keep a list of the medicines you take. How can you care for yourself at home? Following the DASH diet · Eat 4 to 5 servings of fruit each day. A serving is 1 medium-sized piece of fruit, ½ cup chopped or canned fruit, 1/4 cup dried fruit, or 4 ounces (½ cup) of fruit juice. Choose fruit more often than fruit juice. · Eat 4 to 5 servings of vegetables each day. A serving is 1 cup of lettuce or raw leafy vegetables, ½ cup of chopped or cooked vegetables, or 4 ounces (½ cup) of vegetable juice. Choose vegetables more often than vegetable juice. · Get 2 to 3 servings of low-fat and fat-free dairy each day. A serving is 8 ounces of milk, 1 cup of yogurt, or 1 ½ ounces of cheese. · Eat 6 to 8 servings of grains each day. A serving is 1 slice of bread, 1 ounce of dry cereal, or ½ cup of cooked rice, pasta, or cooked cereal. Try to choose whole-grain products as much as possible. · Limit lean meat, poultry, and fish to 2 servings each day. A serving is 3 ounces, about the size of a deck of cards. · Eat 4 to 5 servings of nuts, seeds, and legumes (cooked dried beans, lentils, and split peas) each week.  A serving is 1/3 cup of nuts, 2 tablespoons of seeds, or ½ cup of cooked beans or peas. · Limit fats and oils to 2 to 3 servings each day. A serving is 1 teaspoon of vegetable oil or 2 tablespoons of salad dressing. · Limit sweets and added sugars to 5 servings or less a week. A serving is 1 tablespoon jelly or jam, ½ cup sorbet, or 1 cup of lemonade. · Eat less than 2,300 milligrams (mg) of sodium a day. If you limit your sodium to 1,500 mg a day, you can lower your blood pressure even more. Tips for success · Start small. Do not try to make dramatic changes to your diet all at once. You might feel that you are missing out on your favorite foods and then be more likely to not follow the plan. Make small changes, and stick with them. Once those changes become habit, add a few more changes. · Try some of the following: ¨ Make it a goal to eat a fruit or vegetable at every meal and at snacks. This will make it easy to get the recommended amount of fruits and vegetables each day. ¨ Try yogurt topped with fruit and nuts for a snack or healthy dessert. ¨ Add lettuce, tomato, cucumber, and onion to sandwiches. ¨ Combine a ready-made pizza crust with low-fat mozzarella cheese and lots of vegetable toppings. Try using tomatoes, squash, spinach, broccoli, carrots, cauliflower, and onions. ¨ Have a variety of cut-up vegetables with a low-fat dip as an appetizer instead of chips and dip. ¨ Sprinkle sunflower seeds or chopped almonds over salads. Or try adding chopped walnuts or almonds to cooked vegetables. ¨ Try some vegetarian meals using beans and peas. Add garbanzo or kidney beans to salads. Make burritos and tacos with mashed arteaga beans or black beans. Where can you learn more? Go to http://saw-corina.info/. Enter U094 in the search box to learn more about \"DASH Diet: Care Instructions. \" Current as of: March 23, 2016 Content Version: 11.1 © 7169-7487 [employer].  Care instructions adapted under license by [address] (which disclaims liability or warranty for this information). If you have questions about a medical condition or this instruction, always ask your healthcare professional. [address] any warranty or liability for your use of this information. Introducing [facility]! [employer] introduces Elli patient portal. Now you can access parts of your medical record, email your doctor's office, and request medication refills online. 1. In your internet browser, go to [email or web address] (formerly [email or web address]). [email or web address] (formerly [employer]see) 2. Click on the First Time User? Click Here link in the Sign In box. You will see the New Member Sign Up page. 3. Enter your Elli Access Code exactly as it appears below. You will not need to use this code after youve completed the sign-up process. If you do not sign up before the expiration date, you must request a new code. · Elli Access Code: [email or web address] Expires: 5/2/2017  2:26 PM 
 
4. Enter the last four digits of your Social Security Number (xxxx) and Date of Birth (mm/dd/yyyy) as indicated and click Submit. You will be taken to the next sign-up page. 5. Create a Elli ID. This will be your Elli login ID and cannot be changed, so think of one that is secure and easy to remember. 6. Create a Elli password. You can change your password at any time. 7. Enter your Password Reset Question and Answer. This can be used at a later time if you forget your password. 8. Enter your e-mail address. You will receive e-mail notification when new information is available in [address] 9. Click Sign Up. You can now view and download portions of your medical record. 10. Click the Download Summary menu link to download a portable copy of your medical information. If you have questions, please visit the Frequently Asked Questions section of the Elli website.  Remember, Elli is NOT to be used for urgent needs. For medical emergencies, dial 911. Now available from your iPhone and Android! Please provide this summary of care documentation to your next provider. Your primary care clinician is listed as Tej Downs. If you have any questions after today's visit, please call [phone].

## 2017-02-06 NOTE — PROGRESS NOTES
Chief Complaint   Patient presents with    Labs     *Results    Pre-op Exam     Rt eye cataract surgery       HPI:     This is a 58 y/o female  patient who presents pre operative exam.    Doing well with no unusual complain except stating she is anxious about her up coming cataract surgery in the RT eye. No SOB, CP, dizziness, headache. Denies taking any medication. She she is scheduled RT eye surgery on 2/7/17 with Dr    BP elevated today - 142/87 and Tachycardic - this may be possibly due to situational anxiety. Labs, EKG and CXR  Done at her last visit reviewed with her: CXR unremarkable. EKG show non specific ST depression. No Ischemia or T waves inversion. No prior EKG available for comparison. Labs essentially normal except for slightly elevated LDL and low Vit D        Results for orders placed or performed in visit on 02/02/17   CBC WITH AUTOMATED DIFF   Result Value Ref Range    WBC 6.2 3.4 - 10.8 x10E3/uL    RBC 4.38 3.77 - 5.28 x10E6/uL    HGB 12.9 11.1 - 15.9 g/dL    HCT 40.6 34.0 - 46.6 %    MCV 93 79 - 97 fL    MCH 29.5 26.6 - 33.0 pg    MCHC 31.8 31.5 - 35.7 g/dL    RDW 12.3 12.3 - 15.4 %    PLATELET 141 796 - 549 x10E3/uL    NEUTROPHILS 43 %    Lymphocytes 45 %    MONOCYTES 9 %    EOSINOPHILS 2 %    BASOPHILS 1 %    ABS. NEUTROPHILS 2.7 1.4 - 7.0 x10E3/uL    Abs Lymphocytes 2.8 0.7 - 3.1 x10E3/uL    ABS. MONOCYTES 0.6 0.1 - 0.9 x10E3/uL    ABS. EOSINOPHILS 0.1 0.0 - 0.4 x10E3/uL    ABS. BASOPHILS 0.0 0.0 - 0.2 x10E3/uL    IMMATURE GRANULOCYTES 0 %    ABS. IMM.  GRANS. 0.0 0.0 - 0.1 x10E3/uL   TSH 3RD GENERATION   Result Value Ref Range    TSH 1.700 0.450 - 4.500 uIU/mL   LIPID PANEL   Result Value Ref Range    Cholesterol, total 199 100 - 199 mg/dL    Triglyceride 70 0 - 149 mg/dL    HDL Cholesterol 67 >39 mg/dL    VLDL, calculated 14 5 - 40 mg/dL    LDL, calculated 118 (H) 0 - 99 mg/dL   VITAMIN D, 25 HYDROXY   Result Value Ref Range    VITAMIN D, 25-HYDROXY 26.7 (L) 30.0 - 100.0 ng/mL   METABOLIC PANEL, COMPREHENSIVE   Result Value Ref Range    Glucose 93 65 - 99 mg/dL    BUN 11 8 - 27 mg/dL    Creatinine 0.81 0.57 - 1.00 mg/dL    GFR est non-AA 77 >59 mL/min/1.73    GFR est AA 89 >59 mL/min/1.73    BUN/Creatinine ratio 14 11 - 26    Sodium 146 (H) 134 - 144 mmol/L    Potassium 4.1 3.5 - 5.2 mmol/L    Chloride 107 (H) 96 - 106 mmol/L    CO2 25 18 - 29 mmol/L    Calcium 10.8 (H) 8.7 - 10.3 mg/dL    Protein, total 7.1 6.0 - 8.5 g/dL    Albumin 4.1 3.6 - 4.8 g/dL    GLOBULIN, TOTAL 3.0 1.5 - 4.5 g/dL    A-G Ratio 1.4 1.1 - 2.5    Bilirubin, total 0.8 0.0 - 1.2 mg/dL    Alk. phosphatase 68 39 - 117 IU/L    AST (SGOT) 19 0 - 40 IU/L    ALT (SGPT) 21 0 - 32 IU/L   T4, FREE   Result Value Ref Range    T4, Free 1.23 0.82 - 1.77 ng/dL   HEMOGLOBIN A1C WITH EAG   Result Value Ref Range    Hemoglobin A1c 5.2 4.8 - 5.6 %    Estimated average glucose 103 mg/dL   CVD REPORT   Result Value Ref Range    INTERPRETATION Note          ROS: Pt denies: Wt loss, Fever/Chills, HA, Visual changes, SOB, BASS, Abd pain, N/V/D/C, Blood in stool or urine, Edema. Pertinent positive as above in HPI. All others were negative        History reviewed. No pertinent past medical history. History reviewed. No pertinent past surgical history. Family History   Problem Relation Age of Onset    Diabetes Mother     Cancer Father      unknown       Social History     Social History    Marital status: SINGLE     Spouse name: N/A    Number of children: N/A    Years of education: N/A     Occupational History    Not on file.      Social History Main Topics    Smoking status: Never Smoker    Smokeless tobacco: Never Used    Alcohol use No    Drug use: No    Sexual activity: Yes     Other Topics Concern    Not on file     Social History Narrative       No Known Allergies      Physical Exam:    Vital Signs:     Visit Vitals    /87    Pulse (!) 109    Temp 97.6 °F (36.4 °C) (Oral)    Resp 16    Ht 5' 5\" (1.651 m)    Wt 119 lb (54 kg)    BMI 19.8 kg/m2         General: a, a & o x 3, afebrile,  interacting appropriately, in no acute distress  HEENT: head normocephalic and atraumatic, conjuctiva clear Pupillary opacity , worse in the RT pupil. Skin: warm and dry, no rashes , no bruises, no skin lesions  Neck: supple, symmetrical, no palpable mass, no thyromegaly, no carotid bruits, no JVD  Respiratory: lung sounds clear bilaterally, good respiratory effort, no wheezes or crackles  Cardiovascular: normal S1S2, regular rate and rhythm, no murmurs  Abdomen: non-distended, normal bowel sounds x 4 quadrants, soft, non-tender to palpation,no hepatosplenomegaly  Musculoskeletal: normal ROM on all joints, no swelling or deformity  Psychiatric: a, a & o x 3, appropriate mood and affect, no thought disorder    Assessment/Plan:      ICD-10-CM ICD-9-CM    1. Pre-operative clearance Z01.818 V72.84 Pre-operative clearance right eye cataract surgery    Patient have good functional status. She is able to perform ADLs independently. He can walk without support. Today, clinically this patient appears medically stable and a have a good  functional status and should be okay to proceed with planned surgery as no further testing may impact outcome of surgery. Patient not currently on any medication but advised not to take Asprin 24 to 48 hour after surgery. Advised to ensure BP check prior to surgery due to slightly elevated BP in our office today           2. Elevated BP I10 401.9 Pt advised to check BP at home 2x/week. Low on salt. Suspicious elevated BP may due to anxiety, but will reevaluated in 1 month   3. Cataract of both eyes H26.9 366.9 Follow ed by Ophthalmology   4. Elevated LDL cholesterol level E78.00 272.0 Advised to manage with diet and exercise. Low on fried and other fats   5. Vitamin D insufficiency E55.9 268.9 Advised to Take Vit D 1000 units daily OTC   6. Tachycardia R00.0 785.0 Suspicious of anxiety. Additional Notes: Discussed today's diagnosis, treatment plans. Discussed medication indications and side effects. After Visit Summary: Provided and discussed printed patient instructions. Answered questions in relation to today's diagnosis.   Follow-up Disposition: Follow up 1 month elevated BP and tachycardia            JULIO Buitrago  [address] [place], [place] [phone]

## 2017-02-06 NOTE — PROGRESS NOTES
1. Have you been to the ER, urgent care clinic since your last visit? Hospitalized since your last visit? No    2. Have you seen or consulted any other health care providers outside of the [address] [place] [address] since your last visit? Include any pap smears or colon screening.  No

## 2022-02-06 NOTE — PROGRESS NOTES
Labs reviewed essentially normal except for;  Elevated LDL - 118. Manage with diet and exercise.   Vit d low- advise to take 1000 units daily otc Home